# Patient Record
(demographics unavailable — no encounter records)

---

## 2024-10-14 NOTE — ASSESSMENT
[FreeTextEntry1] : This patient returns today with a chief complaint of left distal radius Salter-Frias II fracture.   INTERVAL HISTORY:  Jasper comes today accompanied by his mother.  He has been doing well.  The patient reports no pain or discomfort.  He has not sustained any re-injuries and comes today for radiographs out of his cast.   Since the day of the last evaluation, there has been no significant change in past medical or social history.   Review of systems today is negative for fever, chills, chest pain, shortness of breath or rashes.   PHYSICAL EXAMINATION: On physical examination today, Jasper is in no apparent distress.  He is pleasant, cooperative and alert, appropriate for age.  Focused examination of his forearm indicates normal alignment of the cast.  There is no evidence of skin maceration.  At this point, a procedure was performed and the cast was bivalved and removed.  The child tolerated the procedure well.  No evidence of skin irritation.  Clinical alignment is well preserved with no tenderness over his distal radius.  The patient does demonstrate stiffness with flexion and extension.  5/5 EPL, EDC, first dorsal interosseus, and FDP to the index finger with capillary refill less than 2 seconds.   X-ray imaging that was obtained today out of the cast; AP, lateral and oblique views of the left wrist indicates sclerosis just proximal to the physis.  In addition, there is evidence of periosteal reaction healing and anatomic alignment of the epiphysis.   ASSESSMENT/PLAN: Jasper is a 12-year-old young man who sustained a left distal radius Salter-Frias II fracture which appears to be adequately healed and clinically healed.  Today, I reviewed the x-ray imaging with Jasper's mother who acted as independent historian given the child's pediatric age.  I would like him to initiate range of motion exercises for the next two weeks and he will refrain from activity.  At that point, he may return to full physical activity.  I would like to see him back for one final followup in approximately four weeks.  At that time, we will obtain x-ray imaging of the left wrist to confirm no growth arrest based on the growth plate involvement with this injury.  Based on minimal displacement, this is highly unlikely.  Jasper and his mother expressed understanding and agree.  A note was provided to the family keeping him out of gym activities at this time.